# Patient Record
Sex: MALE | Race: WHITE | NOT HISPANIC OR LATINO | Employment: FULL TIME | URBAN - METROPOLITAN AREA
[De-identification: names, ages, dates, MRNs, and addresses within clinical notes are randomized per-mention and may not be internally consistent; named-entity substitution may affect disease eponyms.]

---

## 2018-11-13 ENCOUNTER — CLINICAL SUPPORT (OUTPATIENT)
Dept: OTHER | Facility: CLINIC | Age: 28
End: 2018-11-13
Payer: COMMERCIAL

## 2018-11-13 DIAGNOSIS — Z00.8 ENCOUNTER FOR OTHER GENERAL EXAMINATION: ICD-10-CM

## 2018-11-13 PROCEDURE — 99401 PREV MED CNSL INDIV APPRX 15: CPT | Mod: S$GLB,,, | Performed by: INTERNAL MEDICINE

## 2018-11-13 PROCEDURE — 82947 ASSAY GLUCOSE BLOOD QUANT: CPT | Mod: QW,S$GLB,, | Performed by: INTERNAL MEDICINE

## 2018-11-13 PROCEDURE — 80061 LIPID PANEL: CPT | Mod: QW,S$GLB,, | Performed by: INTERNAL MEDICINE

## 2018-11-14 VITALS — HEIGHT: 73 IN

## 2018-11-14 LAB
HBA1C MFR BLD: 8.1 %
HDLC SERPL-MCNC: 59 MG/DL
POC CHOLESTEROL, TOTAL: 192 MG/DL
POC GLUCOSE, FASTING: 93 MG/DL
TRIGL SERPL-MCNC: 44 MG/DL

## 2019-11-07 ENCOUNTER — HOSPITAL ENCOUNTER (EMERGENCY)
Facility: HOSPITAL | Age: 29
Discharge: HOME | End: 2019-11-08
Attending: EMERGENCY MEDICINE
Payer: COMMERCIAL

## 2019-11-07 DIAGNOSIS — E16.2 HYPOGLYCEMIA: Primary | ICD-10-CM

## 2019-11-07 DIAGNOSIS — T38.3X1A OVERDOSE OF INSULIN, ACCIDENTAL OR UNINTENTIONAL, INITIAL ENCOUNTER: ICD-10-CM

## 2019-11-07 LAB
ALBUMIN SERPL BCP-MCNC: 4.2 G/DL (ref 3.5–5.2)
ALP SERPL-CCNC: 49 U/L (ref 55–135)
ALT SERPL W/O P-5'-P-CCNC: 25 U/L (ref 10–44)
ANION GAP SERPL CALC-SCNC: 12 MMOL/L (ref 8–16)
AST SERPL-CCNC: 28 U/L (ref 10–40)
BASOPHILS # BLD AUTO: 0.03 K/UL (ref 0–0.2)
BASOPHILS NFR BLD: 0.4 % (ref 0–1.9)
BILIRUB SERPL-MCNC: 0.3 MG/DL (ref 0.1–1)
BUN SERPL-MCNC: 13 MG/DL (ref 6–20)
CALCIUM SERPL-MCNC: 9.9 MG/DL (ref 8.7–10.5)
CHLORIDE SERPL-SCNC: 103 MMOL/L (ref 95–110)
CO2 SERPL-SCNC: 23 MMOL/L (ref 23–29)
CREAT SERPL-MCNC: 1 MG/DL (ref 0.5–1.4)
DIFFERENTIAL METHOD: ABNORMAL
EOSINOPHIL # BLD AUTO: 0.2 K/UL (ref 0–0.5)
EOSINOPHIL NFR BLD: 2.3 % (ref 0–8)
ERYTHROCYTE [DISTWIDTH] IN BLOOD BY AUTOMATED COUNT: 11.8 % (ref 11.5–14.5)
EST. GFR  (AFRICAN AMERICAN): >60 ML/MIN/1.73 M^2
EST. GFR  (NON AFRICAN AMERICAN): >60 ML/MIN/1.73 M^2
GLUCOSE SERPL-MCNC: 140 MG/DL (ref 70–110)
HCT VFR BLD AUTO: 43 % (ref 40–54)
HGB BLD-MCNC: 13.7 G/DL (ref 14–18)
IMM GRANULOCYTES # BLD AUTO: 0.05 K/UL (ref 0–0.04)
IMM GRANULOCYTES NFR BLD AUTO: 0.6 % (ref 0–0.5)
LYMPHOCYTES # BLD AUTO: 2 K/UL (ref 1–4.8)
LYMPHOCYTES NFR BLD: 23.7 % (ref 18–48)
MCH RBC QN AUTO: 30.7 PG (ref 27–31)
MCHC RBC AUTO-ENTMCNC: 31.9 G/DL (ref 32–36)
MCV RBC AUTO: 96 FL (ref 82–98)
MONOCYTES # BLD AUTO: 0.6 K/UL (ref 0.3–1)
MONOCYTES NFR BLD: 7 % (ref 4–15)
NEUTROPHILS # BLD AUTO: 5.5 K/UL (ref 1.8–7.7)
NEUTROPHILS NFR BLD: 66 % (ref 38–73)
NRBC BLD-RTO: 0 /100 WBC
PLATELET # BLD AUTO: 234 K/UL (ref 150–350)
PMV BLD AUTO: 11.9 FL (ref 9.2–12.9)
POCT GLUCOSE: 136 MG/DL (ref 70–110)
POCT GLUCOSE: 141 MG/DL (ref 70–110)
POCT GLUCOSE: 148 MG/DL (ref 70–110)
POTASSIUM SERPL-SCNC: 3.7 MMOL/L (ref 3.5–5.1)
PROT SERPL-MCNC: 7.5 G/DL (ref 6–8.4)
RBC # BLD AUTO: 4.46 M/UL (ref 4.6–6.2)
SODIUM SERPL-SCNC: 138 MMOL/L (ref 136–145)
WBC # BLD AUTO: 8.28 K/UL (ref 3.9–12.7)

## 2019-11-07 PROCEDURE — 99283 EMERGENCY DEPT VISIT LOW MDM: CPT

## 2019-11-07 PROCEDURE — 82962 GLUCOSE BLOOD TEST: CPT

## 2019-11-07 PROCEDURE — 36000 PLACE NEEDLE IN VEIN: CPT

## 2019-11-07 PROCEDURE — 85025 COMPLETE CBC W/AUTO DIFF WBC: CPT

## 2019-11-07 PROCEDURE — 36415 COLL VENOUS BLD VENIPUNCTURE: CPT

## 2019-11-07 PROCEDURE — 80053 COMPREHEN METABOLIC PANEL: CPT

## 2019-11-07 RX ORDER — ONDANSETRON 2 MG/ML
4 INJECTION INTRAMUSCULAR; INTRAVENOUS
Status: COMPLETED | OUTPATIENT
Start: 2019-11-08 | End: 2019-11-08

## 2019-11-07 RX ORDER — GLUCAGON 1 MG
1 KIT INJECTION
COMMUNITY
Start: 2018-01-23

## 2019-11-07 RX ORDER — INSULIN ASPART 100 [IU]/ML
INJECTION, SOLUTION INTRAVENOUS; SUBCUTANEOUS
COMMUNITY
Start: 2019-06-03

## 2019-11-08 VITALS
SYSTOLIC BLOOD PRESSURE: 129 MMHG | TEMPERATURE: 99 F | RESPIRATION RATE: 16 BRPM | HEART RATE: 83 BPM | BODY MASS INDEX: 21.87 KG/M2 | HEIGHT: 73 IN | OXYGEN SATURATION: 98 % | WEIGHT: 165 LBS | DIASTOLIC BLOOD PRESSURE: 72 MMHG

## 2019-11-08 LAB
POCT GLUCOSE: 168 MG/DL (ref 70–110)
POCT GLUCOSE: 95 MG/DL (ref 70–110)

## 2019-11-08 PROCEDURE — 63600175 PHARM REV CODE 636 W HCPCS: Performed by: EMERGENCY MEDICINE

## 2019-11-08 RX ADMIN — ONDANSETRON 4 MG: 2 INJECTION INTRAMUSCULAR; INTRAVENOUS at 12:11

## 2019-11-08 NOTE — ED NOTES
Pt given juice, sandwich, and candy bar. Pt states that he feels like he cannot eat anything because his stomach is full. Educated pt on need to eat. Dr. Jara notified

## 2019-11-08 NOTE — DISCHARGE INSTRUCTIONS
Eat frequent small meals throughout the day.  Have your wife with you for the next 12 hr.  Follow up with her doctor tomorrow as needed.  Return as needed for any worsening symptoms, problems, questions or concerns.

## 2019-11-08 NOTE — ED PROVIDER NOTES
SCRIBE #1 NOTE: I, Keya Fry, am scribing for, and in the presence of, Josiah Jara Jr., MD. I have scribed the entire note.       History     Chief Complaint   Patient presents with    Medication Reaction     patient states he took 20 units of novolog     Review of patient's allergies indicates:   Allergen Reactions    Sulfa (sulfonamide antibiotics) Hives         History of Present Illness     HPI    11/7/2019, 10:26 PM  History obtained from the patient      History of Present Illness: Ej Skaggs is a 29 y.o. male patient with a PMHx of DM who presents to the Emergency Department for evaluation of medication reaction which onset just PTA. Patient noticed he gave himself 20 units instead of 2 units of NovoLog. Symptoms are constant and moderate in severity. No mitigating or exacerbating factors reported. Associated sxs include fatigue. Patient denies any CP, SOB, dysuria, cough, congestion, fever/chills, n/v/d, abd pain, dizziness, HA, and all other sxs at this time. Prior Tx includes half a gallon of apple juice and a glucagon injection. No further complaints or concerns at this time.       Arrival mode: Personal vehicle    PCP: Primary Doctor No        Past Medical History:  No past medical history on file.    Past Surgical History:  No past surgical history on file.      Family History:  No family history on file.    Social History:  Social History     Tobacco Use    Smoking status: Not on file   Substance and Sexual Activity    Alcohol use: Not on file    Drug use: Not on file    Sexual activity: Not on file        Review of Systems     Review of Systems   Constitutional: Positive for fatigue. Negative for chills and fever.   HENT: Negative for congestion and sore throat.    Respiratory: Negative for cough and shortness of breath.    Cardiovascular: Negative for chest pain.   Gastrointestinal: Negative for abdominal pain, diarrhea, nausea and vomiting.   Genitourinary: Negative for dysuria.  "  Musculoskeletal: Negative for back pain.   Skin: Negative for rash.   Neurological: Negative for dizziness, weakness and headaches.   Hematological: Does not bruise/bleed easily.   All other systems reviewed and are negative.     Physical Exam     Initial Vitals [11/07/19 2204]   BP Pulse Resp Temp SpO2   (!) 135/90 (!) 112 18 97.8 °F (36.6 °C) 99 %      MAP       --          Physical Exam  Nursing Notes and Vital Signs Reviewed.  Constitutional: Patient is in no acute distress. Well-developed and well-nourished.  Head: Atraumatic. Normocephalic.  Eyes: PERRL. EOM intact. Conjunctivae are not pale. No scleral icterus.  ENT: Mucous membranes are moist. Oropharynx is clear and symmetric.    Neck: Supple. Full ROM. No lymphadenopathy.  Cardiovascular: Regular rate. Regular rhythm. No murmurs, rubs, or gallops. Distal pulses are 2+ and symmetric.  Pulmonary/Chest: No respiratory distress. Clear to auscultation bilaterally. No wheezing or rales.  Abdominal: Soft and non-distended.  There is no tenderness.  No rebound, guarding, or rigidity. Good bowel sounds.  Genitourinary: No CVA tenderness  Musculoskeletal: Moves all extremities. No obvious deformities. No edema. No calf tenderness.  Skin: Warm and dry.  Neurological:  Alert, awake, and appropriate.  Normal speech.  No acute focal neurological deficits are appreciated.  Psychiatric: Normal affect. Good eye contact. Appropriate in content.     ED Course   Procedures  ED Vital Signs:  Vitals:    11/07/19 2204   BP: (!) 135/90   Pulse: (!) 112   Resp: 18   Temp: 97.8 °F (36.6 °C)   TempSrc: Oral   SpO2: 99%   Weight: 74.8 kg (165 lb)   Height: 6' 1" (1.854 m)       Abnormal Lab Results:  Labs Reviewed   CBC W/ AUTO DIFFERENTIAL - Abnormal; Notable for the following components:       Result Value    RBC 4.46 (*)     Hemoglobin 13.7 (*)     Mean Corpuscular Hemoglobin Conc 31.9 (*)     Immature Granulocytes 0.6 (*)     Immature Grans (Abs) 0.05 (*)     All other " components within normal limits   COMPREHENSIVE METABOLIC PANEL - Abnormal; Notable for the following components:    Glucose 140 (*)     Alkaline Phosphatase 49 (*)     All other components within normal limits   POCT GLUCOSE - Abnormal; Notable for the following components:    POCT Glucose 141 (*)     All other components within normal limits   POCT GLUCOSE - Abnormal; Notable for the following components:    POCT Glucose 148 (*)     All other components within normal limits   POCT GLUCOSE - Abnormal; Notable for the following components:    POCT Glucose 136 (*)     All other components within normal limits   POCT GLUCOSE - Abnormal; Notable for the following components:    POCT Glucose 168 (*)     All other components within normal limits   POCT GLUCOSE   POCT GLUCOSE MONITORING CONTINUOUS        All Lab Results:  Results for orders placed or performed during the hospital encounter of 11/07/19   CBC auto differential   Result Value Ref Range    WBC 8.28 3.90 - 12.70 K/uL    RBC 4.46 (L) 4.60 - 6.20 M/uL    Hemoglobin 13.7 (L) 14.0 - 18.0 g/dL    Hematocrit 43.0 40.0 - 54.0 %    Mean Corpuscular Volume 96 82 - 98 fL    Mean Corpuscular Hemoglobin 30.7 27.0 - 31.0 pg    Mean Corpuscular Hemoglobin Conc 31.9 (L) 32.0 - 36.0 g/dL    RDW 11.8 11.5 - 14.5 %    Platelets 234 150 - 350 K/uL    MPV 11.9 9.2 - 12.9 fL    Immature Granulocytes 0.6 (H) 0.0 - 0.5 %    Gran # (ANC) 5.5 1.8 - 7.7 K/uL    Immature Grans (Abs) 0.05 (H) 0.00 - 0.04 K/uL    Lymph # 2.0 1.0 - 4.8 K/uL    Mono # 0.6 0.3 - 1.0 K/uL    Eos # 0.2 0.0 - 0.5 K/uL    Baso # 0.03 0.00 - 0.20 K/uL    nRBC 0 0 /100 WBC    Gran% 66.0 38.0 - 73.0 %    Lymph% 23.7 18.0 - 48.0 %    Mono% 7.0 4.0 - 15.0 %    Eosinophil% 2.3 0.0 - 8.0 %    Basophil% 0.4 0.0 - 1.9 %    Differential Method Automated    Comprehensive metabolic panel   Result Value Ref Range    Sodium 138 136 - 145 mmol/L    Potassium 3.7 3.5 - 5.1 mmol/L    Chloride 103 95 - 110 mmol/L    CO2 23 23 -  29 mmol/L    Glucose 140 (H) 70 - 110 mg/dL    BUN, Bld 13 6 - 20 mg/dL    Creatinine 1.0 0.5 - 1.4 mg/dL    Calcium 9.9 8.7 - 10.5 mg/dL    Total Protein 7.5 6.0 - 8.4 g/dL    Albumin 4.2 3.5 - 5.2 g/dL    Total Bilirubin 0.3 0.1 - 1.0 mg/dL    Alkaline Phosphatase 49 (L) 55 - 135 U/L    AST 28 10 - 40 U/L    ALT 25 10 - 44 U/L    Anion Gap 12 8 - 16 mmol/L    eGFR if African American >60 >60 mL/min/1.73 m^2    eGFR if non African American >60 >60 mL/min/1.73 m^2   POCT glucose   Result Value Ref Range    POCT Glucose 141 (H) 70 - 110 mg/dL   POCT glucose   Result Value Ref Range    POCT Glucose 148 (H) 70 - 110 mg/dL   POCT glucose   Result Value Ref Range    POCT Glucose 136 (H) 70 - 110 mg/dL   POCT glucose   Result Value Ref Range    POCT Glucose 95 70 - 110 mg/dL   POCT glucose   Result Value Ref Range    POCT Glucose 168 (H) 70 - 110 mg/dL            The Emergency Provider reviewed the vital signs and test results, which are outlined above.     ED Discussion     12:58 AM: Reassessed pt at this time. Discussed with pt all pertinent ED information and results. Discussed pt dx and plan of tx. Gave pt all f/u and return to the ED instructions. All questions and concerns were addressed at this time. Pt expresses understanding of information and instructions, and is comfortable with plan to discharge. Pt is stable for discharge.    I discussed with patient and/or family/caretaker that evaluation in the ED does not suggest any emergent or life threatening medical conditions requiring immediate intervention beyond what was provided in the ED, and I believe patient is safe for discharge.  Regardless, an unremarkable evaluation in the ED does not preclude the development or presence of a serious of life threatening condition. As such, patient was instructed to return immediately for any worsening or change in current symptoms.      1:00 AM  Patient is stable nontoxic.  Blood sugars holding well.  He is tolerating p.o..   He is past the half-life of the bolus of insulin given.  His wife to be within throughout the night.  Patient has a pump that he will leave off through the night and restart morning.  Discussed all findings with the patient his wife.  They are very reliable understand his condition very well.  She will remain with him and continue to check on her throughout the night.  He is safe for discharge in my opinion they will return if his symptoms worsen in any way.    Medical Decision Making:   Clinical Tests:   Lab Tests: Ordered and Reviewed           ED Medication(s):  Medications   ondansetron injection 4 mg (4 mg Intravenous Given 11/8/19 0000)       New Prescriptions    No medications on file       Follow-up Information     Rehan Ruiz MD In 1 day.    Specialty:  Family Medicine  Contact information:  [address] [facility] [address] [phone]                       Scribe Attestation:   Scribe #1: I performed the above scribed service and the documentation accurately describes the services I performed. I attest to the accuracy of the note.     Attending:   Physician Attestation Statement for Scribe #1: I, Josiah Jara Jr., MD, personally performed the services described in this documentation, as scribed by Keya Fry, in my presence, and it is both accurate and complete.           Clinical Impression       ICD-10-CM ICD-9-CM   1. Hypoglycemia E16.2 251.2   2. Overdose of insulin, accidental or unintentional, initial encounter T38.3X1A 962.3     E858.0       Disposition:   Disposition: Discharged  Condition: Stable       Josiah aJra Jr., MD  11/08/19 0100